# Patient Record
Sex: MALE | Race: WHITE | NOT HISPANIC OR LATINO | URBAN - NONMETROPOLITAN AREA
[De-identification: names, ages, dates, MRNs, and addresses within clinical notes are randomized per-mention and may not be internally consistent; named-entity substitution may affect disease eponyms.]

---

## 2023-10-19 ENCOUNTER — TELEPHONE (OUTPATIENT)
Dept: CARDIOLOGY | Facility: CLINIC | Age: 58
End: 2023-10-19
Payer: MEDICARE

## 2023-10-20 NOTE — TELEPHONE ENCOUNTER
Wife states patient was started on Pantoprazole 40 mg BID be sent to [facility]. She is asking Patience Guerrero for refills. Patient will need to contact PCP for non cardiac medication.

## 2023-10-22 PROBLEM — E11.9 DIABETES MELLITUS (MULTI): Status: ACTIVE | Noted: 2023-10-22

## 2023-10-22 PROBLEM — G47.33 OSA TREATED WITH BIPAP: Status: ACTIVE | Noted: 2023-10-22

## 2023-10-22 PROBLEM — I25.10 CAD (CORONARY ARTERY DISEASE): Status: ACTIVE | Noted: 2023-10-22

## 2023-10-22 PROBLEM — E66.01 MORBID OBESITY WITH BMI OF 50.0-59.9, ADULT (MULTI): Status: ACTIVE | Noted: 2023-10-22

## 2023-10-22 PROBLEM — R06.89 DIFFICULTY BREATHING: Status: ACTIVE | Noted: 2023-10-22

## 2023-10-22 PROBLEM — I50.30 DIASTOLIC CONGESTIVE HEART FAILURE (MULTI): Status: ACTIVE | Noted: 2023-10-22

## 2023-10-22 PROBLEM — I25.2 HISTORY OF NON-ST ELEVATION MYOCARDIAL INFARCTION (NSTEMI): Status: ACTIVE | Noted: 2023-10-22

## 2023-10-22 PROBLEM — I10 HTN (HYPERTENSION): Status: ACTIVE | Noted: 2023-10-22

## 2023-10-22 PROBLEM — E78.5 HYPERLIPIDEMIA: Status: ACTIVE | Noted: 2023-10-22

## 2023-10-22 PROBLEM — F17.201 NICOTINE DEPENDENCE IN REMISSION: Status: ACTIVE | Noted: 2023-10-22

## 2023-10-22 RX ORDER — CLOPIDOGREL BISULFATE 75 MG/1
TABLET ORAL
COMMUNITY
Start: 2022-11-30 | End: 2023-10-24 | Stop reason: ALTCHOICE

## 2023-10-22 RX ORDER — METFORMIN HYDROCHLORIDE 500 MG/1
1 TABLET ORAL EVERY EVENING
COMMUNITY

## 2023-10-22 RX ORDER — PANTOPRAZOLE SODIUM 40 MG/1
40 TABLET, DELAYED RELEASE ORAL 2 TIMES DAILY
COMMUNITY
Start: 2023-09-05

## 2023-10-22 RX ORDER — CARVEDILOL 25 MG/1
TABLET ORAL
COMMUNITY
Start: 2022-01-12 | End: 2024-03-28 | Stop reason: SDUPTHER

## 2023-10-22 RX ORDER — INDAPAMIDE 2.5 MG/1
TABLET ORAL
COMMUNITY
Start: 2023-08-07 | End: 2023-10-24 | Stop reason: SINTOL

## 2023-10-22 RX ORDER — OMEPRAZOLE 40 MG/1
40 CAPSULE, DELAYED RELEASE ORAL DAILY PRN
COMMUNITY
End: 2023-10-24 | Stop reason: ALTCHOICE

## 2023-10-22 RX ORDER — NAPROXEN SODIUM 220 MG/1
1 TABLET, FILM COATED ORAL DAILY
COMMUNITY

## 2023-10-22 RX ORDER — ATORVASTATIN CALCIUM 80 MG/1
1 TABLET, FILM COATED ORAL NIGHTLY
COMMUNITY
Start: 2022-11-30

## 2023-10-22 RX ORDER — AMLODIPINE BESYLATE 10 MG/1
2 TABLET ORAL DAILY
COMMUNITY
Start: 2022-06-16 | End: 2024-05-08 | Stop reason: SDUPTHER

## 2023-10-22 RX ORDER — IRBESARTAN 300 MG/1
300 TABLET ORAL DAILY
COMMUNITY
Start: 2023-09-14 | End: 2023-10-24

## 2023-10-22 RX ORDER — SPIRONOLACTONE 25 MG/1
25 TABLET ORAL DAILY
COMMUNITY
Start: 2023-08-29

## 2023-10-22 RX ORDER — HYDRALAZINE HYDROCHLORIDE 50 MG/1
TABLET, FILM COATED ORAL
COMMUNITY
Start: 2023-08-07 | End: 2023-10-24 | Stop reason: SINTOL

## 2023-10-22 RX ORDER — FUROSEMIDE 20 MG/1
TABLET ORAL
COMMUNITY
Start: 2023-02-02 | End: 2023-10-24 | Stop reason: ALTCHOICE

## 2023-10-22 RX ORDER — NITROGLYCERIN 0.4 MG/1
TABLET SUBLINGUAL
COMMUNITY

## 2023-10-22 RX ORDER — HYDROXYZINE HYDROCHLORIDE 50 MG/1
50 TABLET, FILM COATED ORAL NIGHTLY PRN
COMMUNITY
Start: 2023-09-21

## 2023-10-22 RX ORDER — GLIPIZIDE 5 MG/1
1 TABLET ORAL DAILY
COMMUNITY

## 2023-10-22 RX ORDER — CLONIDINE HYDROCHLORIDE 0.3 MG/1
TABLET ORAL
COMMUNITY
End: 2023-10-24 | Stop reason: DRUGHIGH

## 2023-10-22 RX ORDER — METFORMIN HYDROCHLORIDE 1000 MG/1
1 TABLET ORAL
COMMUNITY

## 2023-10-23 NOTE — TELEPHONE ENCOUNTER
Phoned and spoke with wife. Advised that non-cardiac will need to contact PCP. She will contact them

## 2023-10-24 ENCOUNTER — OFFICE VISIT (OUTPATIENT)
Dept: CARDIOLOGY | Facility: CLINIC | Age: 58
End: 2023-10-24
Payer: MEDICARE

## 2023-10-24 VITALS — SYSTOLIC BLOOD PRESSURE: 130 MMHG | BODY MASS INDEX: 45.1 KG/M2 | WEIGHT: 315 LBS | HEIGHT: 70 IN | HEART RATE: 76 BPM

## 2023-10-24 DIAGNOSIS — I10 PRIMARY HYPERTENSION: ICD-10-CM

## 2023-10-24 DIAGNOSIS — I10 WHITE COAT SYNDROME WITH DIAGNOSIS OF HYPERTENSION: Primary | ICD-10-CM

## 2023-10-24 DIAGNOSIS — E66.01 MORBID OBESITY WITH BMI OF 50.0-59.9, ADULT (MULTI): ICD-10-CM

## 2023-10-24 PROCEDURE — 99212 OFFICE O/P EST SF 10 MIN: CPT | Performed by: NURSE PRACTITIONER

## 2023-10-24 PROCEDURE — 1036F TOBACCO NON-USER: CPT | Performed by: NURSE PRACTITIONER

## 2023-10-24 PROCEDURE — 4010F ACE/ARB THERAPY RXD/TAKEN: CPT | Performed by: NURSE PRACTITIONER

## 2023-10-24 PROCEDURE — 3008F BODY MASS INDEX DOCD: CPT | Performed by: NURSE PRACTITIONER

## 2023-10-24 RX ORDER — IRBESARTAN 75 MG/1
75 TABLET ORAL NIGHTLY
Qty: 30 TABLET | Refills: 11 | Status: SHIPPED | OUTPATIENT
Start: 2023-10-24 | End: 2024-10-23

## 2023-10-24 RX ORDER — CLONIDINE HYDROCHLORIDE 0.2 MG/1
1 TABLET ORAL 2 TIMES DAILY
COMMUNITY
End: 2024-02-07

## 2023-10-24 NOTE — PROGRESS NOTES
Subjective:   Lb Joyce is a 58 y.o. male with hypertension.  Patient presents to the office ambulatory with steady gait, is accompanied by his wife.  Last evaluated in clinic by myself September 2023.  At that time he had been out of clonidine and irbesartan 300 mg for approximately 5 days.  He resumed the dose of clonidine but did not resume irbesartan.    His home wrist blood pressure machine has been calibrated and remains accurate.  His home blood pressure recordings remain with systolic blood pressure consistently in the 130s.  He is once again elevated in the office today consistent with whitecoat syndrome.    Due to history of diabetes would prefer ACE/ARB for renal protection, discussed in detail.  Although somewhat reluctant, he is agreeable to resume atorvastatin 75 mg daily.  He will continue to track his blood pressure at home with a systolic goal less than 140.    Otherwise, patient denies any change in overall cardiovascular status since last evaluation in clinic.    Current Outpatient Medications   Medication Sig Dispense Refill    amLODIPine (Norvasc) 10 mg tablet Take 2 tablets (20 mg) by mouth once daily.      aspirin 81 mg chewable tablet Chew 1 tablet (81 mg) once daily.      atorvastatin (Lipitor) 80 mg tablet Take 1 tablet (80 mg) by mouth once daily at bedtime.      carvedilol (Coreg) 25 mg tablet TAKE 1 AND 1/2 TABLETS TWICE DAILY      cloNIDine (Catapres) 0.2 mg tablet Take 1 tablet (0.2 mg) by mouth 2 times a day.      glipiZIDE (Glucotrol) 5 mg tablet Take 1 tablet (5 mg) by mouth once daily.      hydrOXYzine HCL (Atarax) 50 mg tablet Take 1 tablet (50 mg) by mouth as needed at bedtime.      metFORMIN (Glucophage) 1,000 mg tablet Take 1 tablet (1,000 mg) by mouth once daily in the morning. Take before meals.      metFORMIN (Glucophage) 500 mg tablet Take 1 tablet (500 mg) by mouth once daily in the evening.      nitroglycerin (Nitrostat) 0.4 mg SL tablet PLACE 1 TABLET UNDER THE  "TONGUE EVERY 5 MINUTES UP TO 3 DOSES AS NEEDED FOR CHEST PAIN.      pantoprazole (ProtoNix) 40 mg EC tablet Take 1 tablet (40 mg) by mouth 2 times a day.      spironolactone (Aldactone) 25 mg tablet Take 1 tablet (25 mg) by mouth once daily.      irbesartan (Avapro) 75 mg tablet Take 1 tablet (75 mg) by mouth once daily at bedtime. 30 tablet 11     No current facility-administered medications for this visit.      Hypertension ROS: taking medications as instructed, no medication side effects noted, home BP monitoring in range of 130's systolic over 70's diastolic, no TIA's, no chest pain on exertion, no dyspnea on exertion, and no swelling of ankles.   New concerns: denies.     Objective:   BP (!) 130/0   Pulse 76   Ht 1.778 m (5' 10\")   Wt (!) 195 kg (430 lb)   BMI 61.70 kg/m²    Appearance alert, well appearing, and in no distress.  General exam BP noted to be borderline elevated today in office though normal at other visits, S1, S2 normal, no gallop, no murmur, chest clear, no JVD, no HSM, no edema.   Lab review: no lab studies available for review at time of visit.     Assessment:    Hypertension stable, no significant medication side effects noted, and needs to follow diet more regularly.     White coat syndrome with diagnosis of hypertension  His home machine has been calibrated and remains optimal  Recordings of home blood pressure consistently in the 130s    HTN (hypertension)  Optimal on home recordings  He does have PCP follow-up within the next 8 weeks for reassessment in clinic  Agrees to begin irbesartan 75 mg due to history of diabetes    Morbid obesity with BMI of 50.0-59.9, adult (CMS/[facility])  Reviewed the merits of healthy lifestyle choices on overall cardiovascular health.         Plan:   Reviewed diet, exercise and weight control.  Recommended sodium restriction.    Will begin irbesartan 75mg daily  Keep scheduled follow up with Dr. Hussain Guerrero  MSN, APRN-CNP, PMHNP-[facility] " [facility] - [place]    Please excuse any errors in grammar or translation related to this dictation. Voice recognition software was utilized to prepare this document.

## 2023-10-24 NOTE — ASSESSMENT & PLAN NOTE
His home machine has been calibrated and remains optimal  Recordings of home blood pressure consistently in the 130s

## 2023-10-24 NOTE — ASSESSMENT & PLAN NOTE
Optimal on home recordings  He does have PCP follow-up within the next 8 weeks for reassessment in clinic  Agrees to begin irbesartan 75 mg due to history of diabetes

## 2023-10-24 NOTE — PATIENT INSTRUCTIONS
Please bring all medicines, vitamins, and herbal supplements with you when you come to the office.    Prescriptions will not be filled unless you are compliant with your follow up appointments or have a follow up appointment scheduled as per instruction of your physician. Refills should be requested at the time of your visit.    PLAN:   Through informed decision making process incorporating patients unique circumstances, the following treatment plan will be initiated:    1.  Prescription drug management of cardiovascular medication for efficacy, adherence to treatment, side effect assessment and polypharmacy. Current treatment clinically warranted and to continue with following modifications:    -  Begin irbesartan 75mg daily    2. Return for follow-up; in the interim, contact the office if new symptoms arise.  Dr. Nixon as scheduled    3.  Please call me if home blood pressure becomes elevated.  Goal is top number below 140

## 2023-10-25 DIAGNOSIS — I10 PRIMARY HYPERTENSION: ICD-10-CM

## 2023-10-25 DIAGNOSIS — I10 WHITE COAT SYNDROME WITH DIAGNOSIS OF HYPERTENSION: ICD-10-CM

## 2023-10-25 RX ORDER — IRBESARTAN 75 MG/1
75 TABLET ORAL NIGHTLY
Qty: 30 TABLET | Refills: 11 | OUTPATIENT
Start: 2023-10-25 | End: 2024-10-24

## 2024-02-07 DIAGNOSIS — I10 HYPERTENSION, UNSPECIFIED TYPE: ICD-10-CM

## 2024-02-07 RX ORDER — CLONIDINE HYDROCHLORIDE 0.2 MG/1
0.2 TABLET ORAL 2 TIMES DAILY
Qty: 180 TABLET | Refills: 3 | Status: SHIPPED | OUTPATIENT
Start: 2024-02-07

## 2024-03-28 DIAGNOSIS — I25.2 HISTORY OF NON-ST ELEVATION MYOCARDIAL INFARCTION (NSTEMI): ICD-10-CM

## 2024-03-28 DIAGNOSIS — I10 HYPERTENSION, UNSPECIFIED TYPE: ICD-10-CM

## 2024-04-01 RX ORDER — CARVEDILOL 25 MG/1
37.5 TABLET ORAL
Qty: 270 TABLET | Refills: 3 | Status: SHIPPED | OUTPATIENT
Start: 2024-04-01 | End: 2025-04-01

## 2024-05-08 DIAGNOSIS — I10 HYPERTENSION, UNSPECIFIED TYPE: ICD-10-CM

## 2024-05-09 RX ORDER — AMLODIPINE BESYLATE 10 MG/1
20 TABLET ORAL DAILY
Qty: 2 TABLET | Refills: 3 | Status: SHIPPED | OUTPATIENT
Start: 2024-05-09 | End: 2024-05-15 | Stop reason: SDUPTHER

## 2024-05-15 DIAGNOSIS — I10 HYPERTENSION, UNSPECIFIED TYPE: ICD-10-CM

## 2024-05-15 RX ORDER — AMLODIPINE BESYLATE 10 MG/1
20 TABLET ORAL DAILY
Qty: 180 TABLET | Refills: 3 | Status: SHIPPED | OUTPATIENT
Start: 2024-05-15 | End: 2025-05-15

## 2024-05-15 NOTE — TELEPHONE ENCOUNTER
Last prescription was only sent in for 8 tablets total. Updated request to 90 day supply x3 refills. Pending OV with Dr. Petar Nixon DO on 6/19/2024.

## 2024-06-19 ENCOUNTER — APPOINTMENT (OUTPATIENT)
Dept: CARDIOLOGY | Facility: CLINIC | Age: 59
End: 2024-06-19
Payer: MEDICARE

## 2024-06-19 VITALS
DIASTOLIC BLOOD PRESSURE: 96 MMHG | HEART RATE: 80 BPM | BODY MASS INDEX: 44.1 KG/M2 | SYSTOLIC BLOOD PRESSURE: 215 MMHG | HEIGHT: 71 IN | WEIGHT: 315 LBS

## 2024-06-19 DIAGNOSIS — Z98.61 HISTORY OF PTCA: ICD-10-CM

## 2024-06-19 DIAGNOSIS — E66.01 MORBID OBESITY WITH BMI OF 50.0-59.9, ADULT (MULTI): ICD-10-CM

## 2024-06-19 DIAGNOSIS — I25.2 HISTORY OF NON-ST ELEVATION MYOCARDIAL INFARCTION (NSTEMI): ICD-10-CM

## 2024-06-19 DIAGNOSIS — I25.10 CORONARY ARTERY DISEASE INVOLVING NATIVE CORONARY ARTERY OF NATIVE HEART WITHOUT ANGINA PECTORIS: ICD-10-CM

## 2024-06-19 DIAGNOSIS — I10 WHITE COAT SYNDROME WITH DIAGNOSIS OF HYPERTENSION: ICD-10-CM

## 2024-06-19 DIAGNOSIS — R03.0 BLOOD PRESSURE ELEVATED WITHOUT HISTORY OF HTN: ICD-10-CM

## 2024-06-19 DIAGNOSIS — I50.30 DIASTOLIC CONGESTIVE HEART FAILURE, UNSPECIFIED HF CHRONICITY (MULTI): ICD-10-CM

## 2024-06-19 DIAGNOSIS — I10 PRIMARY HYPERTENSION: ICD-10-CM

## 2024-06-19 DIAGNOSIS — Z87.891 FORMER SMOKER: ICD-10-CM

## 2024-06-19 DIAGNOSIS — E11.9 TYPE 2 DIABETES MELLITUS WITHOUT COMPLICATION, WITHOUT LONG-TERM CURRENT USE OF INSULIN (MULTI): ICD-10-CM

## 2024-06-19 DIAGNOSIS — E78.2 MIXED HYPERLIPIDEMIA: ICD-10-CM

## 2024-06-19 DIAGNOSIS — G47.33 OSA TREATED WITH BIPAP: ICD-10-CM

## 2024-06-19 DIAGNOSIS — I10 HYPERTENSION, UNSPECIFIED TYPE: ICD-10-CM

## 2024-06-19 PROCEDURE — 1036F TOBACCO NON-USER: CPT | Performed by: INTERNAL MEDICINE

## 2024-06-19 PROCEDURE — 3077F SYST BP >= 140 MM HG: CPT | Performed by: INTERNAL MEDICINE

## 2024-06-19 PROCEDURE — 3080F DIAST BP >= 90 MM HG: CPT | Performed by: INTERNAL MEDICINE

## 2024-06-19 PROCEDURE — 4010F ACE/ARB THERAPY RXD/TAKEN: CPT | Performed by: INTERNAL MEDICINE

## 2024-06-19 PROCEDURE — 99214 OFFICE O/P EST MOD 30 MIN: CPT | Performed by: INTERNAL MEDICINE

## 2024-06-19 PROCEDURE — 3008F BODY MASS INDEX DOCD: CPT | Performed by: INTERNAL MEDICINE

## 2024-06-19 RX ORDER — CARVEDILOL 25 MG/1
37.5 TABLET ORAL
Qty: 270 TABLET | Refills: 3 | Status: SHIPPED | OUTPATIENT
Start: 2024-06-19 | End: 2024-06-19 | Stop reason: SDUPTHER

## 2024-06-19 RX ORDER — CLONIDINE HYDROCHLORIDE 0.2 MG/1
0.2 TABLET ORAL 2 TIMES DAILY
Qty: 180 TABLET | Refills: 3 | Status: SHIPPED | OUTPATIENT
Start: 2024-06-19

## 2024-06-19 RX ORDER — SPIRONOLACTONE 25 MG/1
25 TABLET ORAL DAILY
Qty: 90 TABLET | Refills: 3 | Status: SHIPPED | OUTPATIENT
Start: 2024-06-19 | End: 2024-06-19 | Stop reason: SDUPTHER

## 2024-06-19 RX ORDER — AMLODIPINE BESYLATE 10 MG/1
20 TABLET ORAL DAILY
Qty: 180 TABLET | Refills: 3 | Status: SHIPPED | OUTPATIENT
Start: 2024-06-19 | End: 2025-06-19

## 2024-06-19 RX ORDER — CARVEDILOL 25 MG/1
37.5 TABLET ORAL
Qty: 270 TABLET | Refills: 3 | Status: SHIPPED | OUTPATIENT
Start: 2024-06-19 | End: 2025-06-19

## 2024-06-19 RX ORDER — IRBESARTAN 150 MG/1
150 TABLET ORAL DAILY
Qty: 90 TABLET | Refills: 3 | Status: SHIPPED | OUTPATIENT
Start: 2024-06-19 | End: 2025-06-19

## 2024-06-19 RX ORDER — AMLODIPINE BESYLATE 10 MG/1
20 TABLET ORAL DAILY
Qty: 180 TABLET | Refills: 3 | Status: SHIPPED | OUTPATIENT
Start: 2024-06-19 | End: 2024-06-19 | Stop reason: SDUPTHER

## 2024-06-19 RX ORDER — ATORVASTATIN CALCIUM 80 MG/1
80 TABLET, FILM COATED ORAL NIGHTLY
Qty: 90 TABLET | Refills: 3 | Status: SHIPPED | OUTPATIENT
Start: 2024-06-19

## 2024-06-19 RX ORDER — CLONIDINE HYDROCHLORIDE 0.2 MG/1
0.2 TABLET ORAL 2 TIMES DAILY
Qty: 180 TABLET | Refills: 3 | Status: SHIPPED | OUTPATIENT
Start: 2024-06-19 | End: 2024-06-19 | Stop reason: SDUPTHER

## 2024-06-19 RX ORDER — IRBESARTAN 150 MG/1
150 TABLET ORAL DAILY
Qty: 90 TABLET | Refills: 3 | Status: SHIPPED | OUTPATIENT
Start: 2024-06-19 | End: 2024-06-19 | Stop reason: SDUPTHER

## 2024-06-19 RX ORDER — ATORVASTATIN CALCIUM 80 MG/1
80 TABLET, FILM COATED ORAL NIGHTLY
Qty: 90 TABLET | Refills: 3 | Status: SHIPPED | OUTPATIENT
Start: 2024-06-19 | End: 2024-06-19 | Stop reason: SDUPTHER

## 2024-06-19 RX ORDER — SPIRONOLACTONE 25 MG/1
25 TABLET ORAL DAILY
Qty: 90 TABLET | Refills: 3 | Status: SHIPPED | OUTPATIENT
Start: 2024-06-19

## 2024-06-19 NOTE — PATIENT INSTRUCTIONS
Please bring all medicines, vitamins, and herbal supplements with you when you come to the office.    Prescriptions will not be filled unless you are compliant with your follow up appointments or have a follow up appointment scheduled as per instruction of your physician. Refills should be requested at the time of your visit.     BMI was above normal measurement. Current weight: (!) 194 kg (427 lb)  Weight change since last visit (-) denotes wt loss -3 lbs   Weight loss needed to achieve BMI 25: 248.1 Lbs  Weight loss needed to achieve BMI 30: 212.4 Lbs  Provided instructions on dietary changes.

## 2024-06-19 NOTE — LETTER
June 19, 2024     Beverly Fernandez, APRN-CNP  [address] [facility] [address]    Patient: Efrain Joyce   YOB: 1965   Date of Visit: 6/19/2024       Dear Dr. Beverly Fernandez, APRN-CNP:    Thank you for referring Efrain Joyce to me for evaluation. Below are my notes for this consultation.  If you have questions, please do not hesitate to call me. I look forward to following your patient along with you.       Sincerely,     Petar Nixon, DO      CC: No Recipients  ______________________________________________________________________________________    Subjective   Lb Joyce is a 59 y.o. male       Chief Complaint    Follow-up          59-year-old gentleman returns for follow-up he is doing well from a cardiovascular standpoint with no recurrent cardiovascular symptoms, complaints or nitrate usage or hospitalizations.    He has a history of ASHD, morbid obesity, accelerated persistent hypertension, diabetes mellitus. He was a former smoker currently smokes marijuana.     He has a history of high risk non-ST elevation MI January 2022 with revascularization of the proximal circumflex with a large drug-eluting stent he has had no cardiovascular events for the past 18 months.    Today's blood pressure is elevated at 215/96 on my measurement.  He states his blood pressure usually runs 147/80 at home.  His Fitbit heart rate telemetry is reviewed as well.  He has no complaints of heart failure, palpitations or syncope or angina.  He is otherwise ambulatory, reasonably active, morbidly obese    Recommendations: Will escalate his irbesartan 150 mg daily, obtain ambulatory blood pressure check via monitoring at home;, high-sensitivity CRP and lipid panel and follow-up with blood pressure check in the next 6 months with nurse practitioner I will see him again in 1 year           Review of Systems   Cardiovascular:  Positive for chest pain.   All other  "systems reviewed and are negative.           Vitals:    06/19/24 0920 06/19/24 0926 06/19/24 0941   BP: (!) 212/142 (!) 190/90 (!) 215/96   BP Location: Right arm Left arm Left arm   Patient Position: Sitting Sitting Sitting   Pulse: 80     Weight: (!) 194 kg (427 lb)     Height: 1.803 m (5' 11\")          Objective   Physical Exam  Constitutional:       Appearance: Normal appearance.   HENT:      Nose: Nose normal.   Neck:      Vascular: No carotid bruit.   Cardiovascular:      Rate and Rhythm: Normal rate.      Pulses: Normal pulses.      Heart sounds: Normal heart sounds.   Pulmonary:      Effort: Pulmonary effort is normal.   Abdominal:      General: Bowel sounds are normal.      Palpations: Abdomen is soft.   Musculoskeletal:         General: Normal range of motion.      Cervical back: Normal range of motion.      Right lower leg: No edema.      Left lower leg: No edema.   Skin:     General: Skin is warm and dry.   Neurological:      General: No focal deficit present.      Mental Status: He is alert.   Psychiatric:         Mood and Affect: Mood normal.         Behavior: Behavior normal.         Thought Content: Thought content normal.         Judgment: Judgment normal.         Allergies  Hydralazine, Indapamide, and Benzonatate     Current Medications    Current Outpatient Medications:   •  amLODIPine (Norvasc) 10 mg tablet, Take 2 tablets (20 mg) by mouth once daily., Disp: 180 tablet, Rfl: 3  •  aspirin 81 mg chewable tablet, Chew 1 tablet (81 mg) once daily., Disp: , Rfl:   •  atorvastatin (Lipitor) 80 mg tablet, Take 1 tablet (80 mg) by mouth once daily at bedtime., Disp: , Rfl:   •  carvedilol (Coreg) 25 mg tablet, Take 1.5 tablets (37.5 mg) by mouth 2 times a day with meals.  TAKE 1 AND 1/2 TABLETS TWICE DAILY, Disp: 270 tablet, Rfl: 3  •  cloNIDine (Catapres) 0.2 mg tablet, TAKE 1 TABLET TWICE DAILY, Disp: 180 tablet, Rfl: 3  •  glipiZIDE (Glucotrol) 5 mg tablet, Take 1 tablet (5 mg) by mouth once daily., " Disp: , Rfl:   •  hydrOXYzine HCL (Atarax) 50 mg tablet, Take 1 tablet (50 mg) by mouth as needed at bedtime., Disp: , Rfl:   •  irbesartan (Avapro) 75 mg tablet, Take 1 tablet (75 mg) by mouth once daily at bedtime., Disp: 30 tablet, Rfl: 11  •  metFORMIN (Glucophage) 1,000 mg tablet, Take 1 tablet (1,000 mg) by mouth once daily in the morning. Take before meals., Disp: , Rfl:   •  metFORMIN (Glucophage) 500 mg tablet, Take 1 tablet (500 mg) by mouth once daily in the evening., Disp: , Rfl:   •  nitroglycerin (Nitrostat) 0.4 mg SL tablet, PLACE 1 TABLET UNDER THE TONGUE EVERY 5 MINUTES UP TO 3 DOSES AS NEEDED FOR CHEST PAIN., Disp: , Rfl:   •  pantoprazole (ProtoNix) 40 mg EC tablet, Take 1 tablet (40 mg) by mouth 2 times a day., Disp: , Rfl:   •  spironolactone (Aldactone) 25 mg tablet, Take 1 tablet (25 mg) by mouth once daily., Disp: , Rfl:                      Assessment/Plan   1. Primary hypertension        2. Coronary artery disease involving native coronary artery of native heart without angina pectoris        3. History of non-ST elevation myocardial infarction (NSTEMI)        4. History of PTCA        5. Mixed hyperlipidemia        6. Diastolic congestive heart failure, unspecified HF chronicity (Multi)        7. Type 2 diabetes mellitus without complication, without long-term current use of insulin (Multi)        8. Morbid obesity with BMI of 50.0-59.9, adult (Multi)        9. MATY treated with BiPAP        10. Former smoker        11. White coat syndrome with diagnosis of hypertension        12. Hypertension, unspecified type                 Scribe Attestation  By signing my name below, I, La Nena Velasquez LPN   attest that this documentation has been prepared under the direction and in the presence of Petar Nixon DO.     Provider Attestation - Scribe documentation    All medical record entries made by the Scribe were at my direction and personally dictated by me. I have reviewed the chart and  agree that the record accurately reflects my personal performance of the history, physical exam, discussion and plan.

## 2024-06-19 NOTE — PROGRESS NOTES
"Subjective   Lb Joyce is a 59 y.o. male       Chief Complaint    Follow-up          59-year-old gentleman returns for follow-up he is doing well from a cardiovascular standpoint with no recurrent cardiovascular symptoms, complaints or nitrate usage or hospitalizations.    He has a history of ASHD, morbid obesity, accelerated persistent hypertension, diabetes mellitus. He was a former smoker currently smokes marijuana.     He has a history of high risk non-ST elevation MI January 2022 with revascularization of the proximal circumflex with a large drug-eluting stent he has had no cardiovascular events for the past 18 months.    Today's blood pressure is elevated at 215/96 on my measurement.  He states his blood pressure usually runs 147/80 at home.  His Fitbit heart rate telemetry is reviewed as well.  He has no complaints of heart failure, palpitations or syncope or angina.  He is otherwise ambulatory, reasonably active, morbidly obese    Recommendations: Will escalate his irbesartan 150 mg daily, obtain ambulatory blood pressure check via monitoring at home;, high-sensitivity CRP and lipid panel and follow-up with blood pressure check in the next 6 months with nurse practitioner I will see him again in 1 year           Review of Systems   Cardiovascular:  Positive for chest pain.   All other systems reviewed and are negative.           Vitals:    06/19/24 0920 06/19/24 0926 06/19/24 0941   BP: (!) 212/142 (!) 190/90 (!) 215/96   BP Location: Right arm Left arm Left arm   Patient Position: Sitting Sitting Sitting   Pulse: 80     Weight: (!) 194 kg (427 lb)     Height: 1.803 m (5' 11\")          Objective   Physical Exam  Constitutional:       Appearance: Normal appearance.   HENT:      Nose: Nose normal.   Neck:      Vascular: No carotid bruit.   Cardiovascular:      Rate and Rhythm: Normal rate.      Pulses: Normal pulses.      Heart sounds: Normal heart sounds.   Pulmonary:      Effort: Pulmonary effort is " normal.   Abdominal:      General: Bowel sounds are normal.      Palpations: Abdomen is soft.   Musculoskeletal:         General: Normal range of motion.      Cervical back: Normal range of motion.      Right lower leg: No edema.      Left lower leg: No edema.   Skin:     General: Skin is warm and dry.   Neurological:      General: No focal deficit present.      Mental Status: He is alert.   Psychiatric:         Mood and Affect: Mood normal.         Behavior: Behavior normal.         Thought Content: Thought content normal.         Judgment: Judgment normal.         Allergies  Hydralazine, Indapamide, and Benzonatate     Current Medications    Current Outpatient Medications:     amLODIPine (Norvasc) 10 mg tablet, Take 2 tablets (20 mg) by mouth once daily., Disp: 180 tablet, Rfl: 3    aspirin 81 mg chewable tablet, Chew 1 tablet (81 mg) once daily., Disp: , Rfl:     atorvastatin (Lipitor) 80 mg tablet, Take 1 tablet (80 mg) by mouth once daily at bedtime., Disp: , Rfl:     carvedilol (Coreg) 25 mg tablet, Take 1.5 tablets (37.5 mg) by mouth 2 times a day with meals.  TAKE 1 AND 1/2 TABLETS TWICE DAILY, Disp: 270 tablet, Rfl: 3    cloNIDine (Catapres) 0.2 mg tablet, TAKE 1 TABLET TWICE DAILY, Disp: 180 tablet, Rfl: 3    glipiZIDE (Glucotrol) 5 mg tablet, Take 1 tablet (5 mg) by mouth once daily., Disp: , Rfl:     hydrOXYzine HCL (Atarax) 50 mg tablet, Take 1 tablet (50 mg) by mouth as needed at bedtime., Disp: , Rfl:     irbesartan (Avapro) 75 mg tablet, Take 1 tablet (75 mg) by mouth once daily at bedtime., Disp: 30 tablet, Rfl: 11    metFORMIN (Glucophage) 1,000 mg tablet, Take 1 tablet (1,000 mg) by mouth once daily in the morning. Take before meals., Disp: , Rfl:     metFORMIN (Glucophage) 500 mg tablet, Take 1 tablet (500 mg) by mouth once daily in the evening., Disp: , Rfl:     nitroglycerin (Nitrostat) 0.4 mg SL tablet, PLACE 1 TABLET UNDER THE TONGUE EVERY 5 MINUTES UP TO 3 DOSES AS NEEDED FOR CHEST PAIN.,  Disp: , Rfl:     pantoprazole (ProtoNix) 40 mg EC tablet, Take 1 tablet (40 mg) by mouth 2 times a day., Disp: , Rfl:     spironolactone (Aldactone) 25 mg tablet, Take 1 tablet (25 mg) by mouth once daily., Disp: , Rfl:                      Assessment/Plan   1. Primary hypertension        2. Coronary artery disease involving native coronary artery of native heart without angina pectoris        3. History of non-ST elevation myocardial infarction (NSTEMI)        4. History of PTCA        5. Mixed hyperlipidemia        6. Diastolic congestive heart failure, unspecified HF chronicity (Multi)        7. Type 2 diabetes mellitus without complication, without long-term current use of insulin (Multi)        8. Morbid obesity with BMI of 50.0-59.9, adult (Multi)        9. MATY treated with BiPAP        10. Former smoker        11. White coat syndrome with diagnosis of hypertension        12. Hypertension, unspecified type                 Scribe Attestation  By signing my name below, IAlejandra LPN, Scribe   attest that this documentation has been prepared under the direction and in the presence of Petar Nixon DO.     Provider Attestation - Scribe documentation    All medical record entries made by the Scribe were at my direction and personally dictated by me. I have reviewed the chart and agree that the record accurately reflects my personal performance of the history, physical exam, discussion and plan.

## 2024-06-20 ENCOUNTER — TELEPHONE (OUTPATIENT)
Dept: CARDIOLOGY | Facility: CLINIC | Age: 59
End: 2024-06-20
Payer: MEDICARE

## 2024-06-20 NOTE — TELEPHONE ENCOUNTER
----- Message from Lb Joyce sent at 6/19/2024  7:40 PM EDT -----  Regarding: Blood pressure at home  Contact: [phone]  When I got home this morning at 10:32 my bp was 176/80 with 69 pulse. About 16 minutes later it was 149/79 with 65 pulse. At 1:12pm it was 133/72 with 62 pulse.  My wife checked with [facility] and they do not do the 24 hour blood pressure monitoring anymore. So we will continue to monitor at home and keep you posted.  Thank you.

## 2024-06-25 DIAGNOSIS — I10 WHITE COAT SYNDROME WITH DIAGNOSIS OF HYPERTENSION: ICD-10-CM

## 2024-06-25 DIAGNOSIS — I10 PRIMARY HYPERTENSION: ICD-10-CM

## 2024-06-25 RX ORDER — IRBESARTAN 150 MG/1
150 TABLET ORAL DAILY
Qty: 90 TABLET | Refills: 3 | OUTPATIENT
Start: 2024-06-25 | End: 2025-06-25

## 2024-06-27 ENCOUNTER — TELEPHONE (OUTPATIENT)
Dept: CARDIOLOGY | Facility: CLINIC | Age: 59
End: 2024-06-27
Payer: MEDICARE

## 2024-06-27 NOTE — TELEPHONE ENCOUNTER
Phoned pt re 24 hr bp check order. [facility] states they no longer do the monitor due to insurance not paying.

## 2024-07-03 LAB
NON-UH HIE C-REACTIVE PROTEIN: < 0.5 (ref 0–0.5)
NON-UH HIE CHOL/HDL RATIO: 3
NON-UH HIE CHOLESTEROL: 124 MG/DL (ref 140–200)
NON-UH HIE HDL CHOLESTEROL: 41 MG/DL (ref 23–92)
NON-UH HIE LDL CHOLESTEROL,CALCULATED: 65 MG/DL (ref 0–100)
NON-UH HIE TRIGLYCERIDE W/REFLEX: 91 MG/DL (ref 0–149)
NON-UH HIE VLDL CHOLESTEROL: 18 MG/DL

## 2024-07-08 ENCOUNTER — TELEPHONE (OUTPATIENT)
Dept: CARDIOLOGY | Facility: CLINIC | Age: 59
End: 2024-07-08
Payer: MEDICARE

## 2024-07-08 DIAGNOSIS — I10 HYPERTENSION, UNSPECIFIED TYPE: ICD-10-CM

## 2024-07-08 NOTE — TELEPHONE ENCOUNTER
Spoke with wife of patient, verbalized understanding. Will continue to check bp in early morning and gaby afternoon. Will split Amlodipine dose 10mg in morning 10 mg nightly.

## 2024-07-08 NOTE — TELEPHONE ENCOUNTER
----- Message from Petar Nixon DO sent at 7/8/2024  2:34 PM EDT -----  Regarding: Ambulatory blood pressure  Thank you for sending in your ambulatory blood pressure measurements over June; looks like your morning blood pressures are the main problem I would simply split your amlodipine that you take 2 tablets daily into morning and evening dosing so 10 mg in the morning and 10 mg in the evening; and remeasure blood pressures.  I think you only need to measure may be 2 blood pressure measurements in the morning and 1 in the early evening  ----- Message -----  From: Zee Randhawa MA  Sent: 7/5/2024  10:14 AM EDT  To: Petar Nixon DO

## 2024-07-11 RX ORDER — AMLODIPINE BESYLATE 10 MG/1
20 TABLET ORAL DAILY
Qty: 180 TABLET | Refills: 3 | Status: SHIPPED | OUTPATIENT
Start: 2024-07-11 | End: 2025-07-11

## 2024-07-25 ENCOUNTER — TELEPHONE (OUTPATIENT)
Dept: CARDIOLOGY | Facility: CLINIC | Age: 59
End: 2024-07-25
Payer: MEDICARE

## 2024-07-25 NOTE — TELEPHONE ENCOUNTER
Phoned patient and discussed good reading and to continue same course. Verbalized understanding.

## 2024-07-25 NOTE — TELEPHONE ENCOUNTER
----- Message from Petar Nixon sent at 7/25/2024 12:12 PM EDT -----  Regarding: Blood pressures  For the most part her blood pressures are under good control and mid to latter portion of the day and high in the early morning I will continue current therapies  ----- Message -----  From: Ginny Acuna  Sent: 7/24/2024   2:21 PM EDT  To: Petar Nixon, DO

## 2024-11-10 PROCEDURE — 93306 TTE W/DOPPLER COMPLETE: CPT | Performed by: INTERNAL MEDICINE

## 2024-11-18 ENCOUNTER — TELEPHONE (OUTPATIENT)
Dept: CARDIOLOGY | Facility: CLINIC | Age: 59
End: 2024-11-18
Payer: MEDICARE

## 2024-11-18 NOTE — TELEPHONE ENCOUNTER
Patient wife phoned in left vm via the nurse line reports was at [facility] 11/2024 for new onset afib (consult in chart, but we need to obtain results and soc, and discharge). Wife reports patient since discharge complaining of fatigue, weakness and headaches. Inquiring if dose adjustments can be made.    We need to update the med list once records recvd and send stat to Dr. Petar Nixon, DO

## 2024-11-19 RX ORDER — DOFETILIDE 0.25 MG/1
250 CAPSULE ORAL EVERY 12 HOURS
COMMUNITY

## 2024-11-19 NOTE — TELEPHONE ENCOUNTER
H &P and consult in the chart.  Call placed to [facility]. Discharge summary requested. Per [facility] MR discharge summary is not available but will sent over discharge meds

## 2024-12-09 ENCOUNTER — APPOINTMENT (OUTPATIENT)
Dept: CARDIOLOGY | Facility: CLINIC | Age: 59
End: 2024-12-09
Payer: MEDICARE

## 2024-12-13 ENCOUNTER — APPOINTMENT (OUTPATIENT)
Dept: CARDIOLOGY | Facility: CLINIC | Age: 59
End: 2024-12-13
Payer: MEDICARE

## 2024-12-13 VITALS
WEIGHT: 315 LBS | BODY MASS INDEX: 44.1 KG/M2 | HEIGHT: 71 IN | DIASTOLIC BLOOD PRESSURE: 76 MMHG | HEART RATE: 81 BPM | SYSTOLIC BLOOD PRESSURE: 132 MMHG

## 2024-12-13 DIAGNOSIS — I10 PRIMARY HYPERTENSION: ICD-10-CM

## 2024-12-13 DIAGNOSIS — I25.10 CORONARY ARTERY DISEASE INVOLVING NATIVE CORONARY ARTERY OF NATIVE HEART WITHOUT ANGINA PECTORIS: ICD-10-CM

## 2024-12-13 DIAGNOSIS — G47.33 OSA TREATED WITH BIPAP: ICD-10-CM

## 2024-12-13 DIAGNOSIS — I48.91 ATRIAL FIBRILLATION WITH RAPID VENTRICULAR RESPONSE (MULTI): ICD-10-CM

## 2024-12-13 DIAGNOSIS — Z79.01 LONG TERM CURRENT USE OF ANTICOAGULANT THERAPY: ICD-10-CM

## 2024-12-13 DIAGNOSIS — I48.0 PAROXYSMAL ATRIAL FIBRILLATION (MULTI): ICD-10-CM

## 2024-12-13 DIAGNOSIS — E11.65 TYPE 2 DIABETES MELLITUS WITH HYPERGLYCEMIA, WITHOUT LONG-TERM CURRENT USE OF INSULIN: Primary | ICD-10-CM

## 2024-12-13 DIAGNOSIS — E78.2 MIXED HYPERLIPIDEMIA: ICD-10-CM

## 2024-12-13 DIAGNOSIS — Z79.899 HIGH RISK MEDICATION USE: ICD-10-CM

## 2024-12-13 PROBLEM — E66.01 MORBID OBESITY WITH BMI OF 50.0-59.9, ADULT (MULTI): Status: RESOLVED | Noted: 2023-10-22 | Resolved: 2024-12-13

## 2024-12-13 PROBLEM — I25.2 HISTORY OF NON-ST ELEVATION MYOCARDIAL INFARCTION (NSTEMI): Status: RESOLVED | Noted: 2023-10-22 | Resolved: 2024-12-13

## 2024-12-13 PROBLEM — I25.119 ATHEROSCLEROSIS OF NATIVE CORONARY ARTERY OF NATIVE HEART WITH ANGINA PECTORIS: Status: ACTIVE | Noted: 2023-10-22

## 2024-12-13 PROBLEM — I25.119 ATHEROSCLEROSIS OF NATIVE CORONARY ARTERY OF NATIVE HEART WITH ANGINA PECTORIS: Status: RESOLVED | Noted: 2023-10-22 | Resolved: 2024-12-13

## 2024-12-13 PROBLEM — E11.9 DIABETES MELLITUS (MULTI): Status: RESOLVED | Noted: 2023-10-22 | Resolved: 2024-12-13

## 2024-12-13 RX ORDER — IRBESARTAN 75 MG/1
1 TABLET ORAL DAILY
COMMUNITY
Start: 2024-11-11

## 2024-12-13 RX ORDER — NAPROXEN SODIUM 220 MG/1
TABLET, FILM COATED ORAL
Qty: 30 TABLET | Refills: 11 | Status: SHIPPED | OUTPATIENT
Start: 2024-12-13

## 2024-12-13 RX ORDER — CLONIDINE HYDROCHLORIDE 0.2 MG/1
0.2 TABLET ORAL 2 TIMES DAILY
COMMUNITY
Start: 2023-08-11

## 2024-12-13 ASSESSMENT — ENCOUNTER SYMPTOMS
IRREGULAR HEARTBEAT: 0
DYSPNEA ON EXERTION: 0
ORTHOPNEA: 0
NEAR-SYNCOPE: 0
PALPITATIONS: 0
SYNCOPE: 0
PND: 0

## 2024-12-13 NOTE — ASSESSMENT & PLAN NOTE
Dofetilide  Start date November 2024  EKG in office normal sinus rhythm, QTc 441.  Creatinine 0.77

## 2024-12-13 NOTE — PROGRESS NOTES
"Chief Complaint  \" Doing okay\"    Reason for Visit  6-month follow-up.  Patient presents to the office today for outpatient follow-up for atrial fibrillation, anticoagulation, high risk med use, coronary artery disease.  Last evaluated in clinic by Dr. Nixon 2024    Presents today ambulatory with steady gait.  Accompanied by spouse    [place]: [place] [facility] [place] noted to have A-fib with RVR.  Seen in consult by Dr. Nixon.  Echocardiogram unremarkable.  Spontaneous conversion normal sinus rhythm initiated on Tikosyn and anticoagulation.    History of Present Illness   Patient is a pleasant 59-year-old gentleman who presents to the office today with no voiced cardiovascular complaints.  He overall reports doing\" okay\".  Since started on Eliquis he had to go off his NSAIDs and has noted increased pain and myalgia.  He describes an atypical chest wall discomfort that kind of comes and goes throughout the day and is not consistent with angina.  He continues to watch his heart rate on his Fitbit without any accelerated heart rate.    Lifestyle modifications for PAF reviewed includin.  Limit alcohol intake: Denies  2.  Limit caffeine intake: Excessive greater than 1 L Mountain Dew, Pepsi daily  3.  Discussed importance of optimal weight control and impact on PAF.  Encourage to obtain optimal BMI.  4.  Obstructive Sleep Apnea: Compliant with BiPAP    Patient reports that overall has no complaint(s) of chest pain, chest pressure/discomfort, claudication, dyspnea, exertional chest pressure/discomfort, fatigue, and irregular heart beat    Daily activity:    Right at 4 METS  Denies any change in exercise capacity or functional tolerance since last office visit.    The importance of secondary prevention reviewed:  HTN: Optimal  HLD: Optimally treated  DM: Treated  Smoker: Denies  BMI:  Reviewed the merits of healthy lifestyle choices on overall cardiovascular health.    Reviewed the " "pathophysiology of atrial fibrillation and need for cardioembolic protection.  Question if this is lone episode of A-fib in the setting of URI and over-the-counter medication.  Due to body habitus, labile blood pressure control and excessive caffeine intake patient is at high risk for for atrial fibrillation.  For now, we will continue with Tikosyn and anticoagulation.    Review of Systems   Cardiovascular:  Negative for chest pain, dyspnea on exertion, irregular heartbeat, leg swelling, near-syncope, orthopnea, palpitations, paroxysmal nocturnal dyspnea and syncope.        Visit Vitals  /76 (BP Location: Right arm, Patient Position: Sitting)   Pulse 81   Ht 1.803 m (5' 11\")   Wt (!) 192 kg (423 lb)   BMI 59.00 kg/m²   Smoking Status Former   BSA 3.1 m²     Physical Exam  Vitals and nursing note reviewed.   Constitutional:       Appearance: Normal appearance. He is morbidly obese.   Cardiovascular:      Rate and Rhythm: Normal rate and regular rhythm.      Heart sounds: Normal heart sounds.   Pulmonary:      Effort: Pulmonary effort is normal.      Breath sounds: Normal breath sounds.   Musculoskeletal:      Cervical back: Full passive range of motion without pain.      Right lower leg: No edema.      Left lower leg: No edema.   Skin:     General: Skin is cool.   Neurological:      Mental Status: He is alert and oriented to person, place, and time.   Psychiatric:         Attention and Perception: Attention normal.         Mood and Affect: Mood normal.         Behavior: Behavior is cooperative.        Allergies   Allergen Reactions    Hydralazine Dizziness    Indapamide Dizziness    Benzonatate Palpitations     Current Outpatient Medications   Medication Instructions    amLODIPine (NORVASC) 20 mg, oral, Daily    apixaban (ELIQUIS) 5 mg, 2 times daily    aspirin 81 mg chewable tablet Take one tablet by mouth twice a week    atorvastatin (LIPITOR) 80 mg, oral, Nightly    carvedilol (COREG) 37.5 mg, oral, 2 times " daily (morning and late afternoon),  TAKE 1 AND 1/2 TABLETS TWICE DAILY    cloNIDine (CATAPRES) 0.2 mg, 2 times daily    dofetilide (TIKOSYN) 250 mcg, Every 12 hours    glipiZIDE (Glucotrol) 5 mg tablet 1 tablet, Daily    irbesartan (Avapro) 75 mg tablet 1 tablet, Daily    metFORMIN (Glucophage) 1,000 mg tablet 1 tablet, Daily before breakfast    metFORMIN (Glucophage) 500 mg tablet 1 tablet, Every evening    nitroglycerin (Nitrostat) 0.4 mg SL tablet PLACE 1 TABLET UNDER THE TONGUE EVERY 5 MINUTES UP TO 3 DOSES AS NEEDED FOR CHEST PAIN.    pantoprazole (PROTONIX) 40 mg, 2 times daily    spironolactone (ALDACTONE) 25 mg, oral, Daily      Assessment:    High risk medication use  Dofetilide  Start date November 2024  EKG in office normal sinus rhythm, QTc 441.  Creatinine 0.77    HTN (hypertension)  Optimal in office    Hyperlipidemia  High intensity statin  October 2024 HDL 40, LDL 77      Paroxysmal atrial fibrillation (Multi)  Initial identification November 2024 hospitalization  He reports taking some type of over-the-counter cold medication then within 30 minutes developed tachycardia-presented with A-fib with RVR heart rate in the 150s.  I believe he converted on a Cardizem drip and then was initiated on Tikosyn and anticoagulation.    He continues to follow his heart rates with his Fitbit watch and has not noted any elevated heart rates.    Inpatient echocardiogram normal LVEF 60 to 65%  Left atrium normal  MR mild    Coronary artery disease involving native coronary artery of native heart without angina pectoris  2022 circumflex PCI    August 2023 cardiac cath  Proximal circumflex patent stent  Otherwise normal coronaries    Current daily activity at 4 METS without concerning symptoms  Troponin and EKG during recent hospitalization negative in the setting of A-fib with RVR    Long term current use of anticoagulant therapy  CHADS VASc 3 anticoagulated full dose Eliquis age 59, weight 192 kg.  He has medical  coverage  Denies bleeding diatheses    BMI 50.0-59.9, adult (Multi)  Reviewed the merits of healthy lifestyle choices on overall cardiovascular health.      MATY treated with BiPAP  Compliant with BiPAP treatment    Plan:     Through informed decision making process incorporating patients unique circumstances, the following treatment plan will be initiated:    1.  Prescription drug management of cardiovascular medication for efficacy, adherence to treatment, side effect assessment and polypharmacy. Current treatment clinically warranted and to continue with following modifications:    -  Reduce ASA 81mg to twice a week    2. Return for follow-up; in the interim, contact the office if new symptoms arise.  Dr. Nixon 6 months     Patience Guerrero MSN, APRN-CNP, PMHNP-[facility] [place]  [place], [place]     Please excuse any errors in grammar or translation related to this dictation. Voice recognition software was utilized to prepare this document.

## 2024-12-13 NOTE — LETTER
"2024     Brittney Macedo, APRN-CNP   [place] [address]    Patient: Efrain Joyce   YOB: 1965   Date of Visit: 2024       Dear Dr. Brittney Macedo, APRN-CNP:    Thank you for referring Ed Isiah to me for evaluation. Below are my notes for this consultation.  If you have questions, please do not hesitate to call me. I look forward to following your patient along with you.       Sincerely,     Patience Guerrero, APRN-CNP      CC: No Recipients  ______________________________________________________________________________________    Chief Complaint  \" Doing okay\"    Reason for Visit  6-month follow-up.  Patient presents to the office today for outpatient follow-up for atrial fibrillation, anticoagulation, high risk med use, coronary artery disease.  Last evaluated in clinic by Dr. Nixon 2024    Presents today ambulatory with steady gait.  Accompanied by spouse    2024: Present[place] [facility] [place], noted to have A-fib with RVR.  Seen in consult by Dr. Nixon.  Echocardiogram unremarkable.  Spontaneous conversion normal sinus rhythm initiated on Tikosyn and anticoagulation.    History of Present Illness   Patient is a pleasant 59-year-old gentleman who presents to the office today with no voiced cardiovascular complaints.  He overall reports doing\" okay\".  Since started on Eliquis he had to go off his NSAIDs and has noted increased pain and myalgia.  He describes an atypical chest wall discomfort that kind of comes and goes throughout the day and is not consistent with angina.  He continues to watch his heart rate on his Fitbit without any accelerated heart rate.    Lifestyle modifications for PAF reviewed includin.  Limit alcohol intake: Denies  2.  Limit caffeine intake: Excessive greater than 1 L Mountain Dew, Pepsi daily  3.  Discussed importance of optimal weight control and impact on PAF.  Encourage to obtain optimal BMI.  4.  Obstructive " "Sleep Apnea: Compliant with BiPAP    Patient reports that overall has no complaint(s) of chest pain, chest pressure/discomfort, claudication, dyspnea, exertional chest pressure/discomfort, fatigue, and irregular heart beat    Daily activity:    Right at 4 METS  Denies any change in exercise capacity or functional tolerance since last office visit.    The importance of secondary prevention reviewed:  HTN: Optimal  HLD: Optimally treated  DM: Treated  Smoker: Denies  BMI:  Reviewed the merits of healthy lifestyle choices on overall cardiovascular health.    Reviewed the pathophysiology of atrial fibrillation and need for cardioembolic protection.  Question if this is lone episode of A-fib in the setting of URI and over-the-counter medication.  Due to body habitus, labile blood pressure control and excessive caffeine intake patient is at high risk for for atrial fibrillation.  For now, we will continue with Tikosyn and anticoagulation.    Review of Systems   Cardiovascular:  Negative for chest pain, dyspnea on exertion, irregular heartbeat, leg swelling, near-syncope, orthopnea, palpitations, paroxysmal nocturnal dyspnea and syncope.        Visit Vitals  /76 (BP Location: Right arm, Patient Position: Sitting)   Pulse 81   Ht 1.803 m (5' 11\")   Wt (!) 192 kg (423 lb)   BMI 59.00 kg/m²   Smoking Status Former   BSA 3.1 m²     Physical Exam  Vitals and nursing note reviewed.   Constitutional:       Appearance: Normal appearance. He is morbidly obese.   Cardiovascular:      Rate and Rhythm: Normal rate and regular rhythm.      Heart sounds: Normal heart sounds.   Pulmonary:      Effort: Pulmonary effort is normal.      Breath sounds: Normal breath sounds.   Musculoskeletal:      Cervical back: Full passive range of motion without pain.      Right lower leg: No edema.      Left lower leg: No edema.   Skin:     General: Skin is cool.   Neurological:      Mental Status: He is alert and oriented to person, place, and " time.   Psychiatric:         Attention and Perception: Attention normal.         Mood and Affect: Mood normal.         Behavior: Behavior is cooperative.        Allergies   Allergen Reactions   • Hydralazine Dizziness   • Indapamide Dizziness   • Benzonatate Palpitations     Current Outpatient Medications   Medication Instructions   • amLODIPine (NORVASC) 20 mg, oral, Daily   • apixaban (ELIQUIS) 5 mg, 2 times daily   • aspirin 81 mg chewable tablet Take one tablet by mouth twice a week   • atorvastatin (LIPITOR) 80 mg, oral, Nightly   • carvedilol (COREG) 37.5 mg, oral, 2 times daily (morning and late afternoon),  TAKE 1 AND 1/2 TABLETS TWICE DAILY   • cloNIDine (CATAPRES) 0.2 mg, 2 times daily   • dofetilide (TIKOSYN) 250 mcg, Every 12 hours   • glipiZIDE (Glucotrol) 5 mg tablet 1 tablet, Daily   • irbesartan (Avapro) 75 mg tablet 1 tablet, Daily   • metFORMIN (Glucophage) 1,000 mg tablet 1 tablet, Daily before breakfast   • metFORMIN (Glucophage) 500 mg tablet 1 tablet, Every evening   • nitroglycerin (Nitrostat) 0.4 mg SL tablet PLACE 1 TABLET UNDER THE TONGUE EVERY 5 MINUTES UP TO 3 DOSES AS NEEDED FOR CHEST PAIN.   • pantoprazole (PROTONIX) 40 mg, 2 times daily   • spironolactone (ALDACTONE) 25 mg, oral, Daily      Assessment:    High risk medication use  Dofetilide  Start date November 2024  EKG in office normal sinus rhythm, QTc 441.  Creatinine 0.77    HTN (hypertension)  Optimal in office    Hyperlipidemia  High intensity statin  October 2024 HDL 40, LDL 77      Paroxysmal atrial fibrillation (Multi)  Initial identification November 2024 hospitalization  He reports taking some type of over-the-counter cold medication then within 30 minutes developed tachycardia-presented with A-fib with RVR heart rate in the 150s.  I believe he converted on a Cardizem drip and then was initiated on Tikosyn and anticoagulation.    He continues to follow his heart rates with his Fitbit watch and has not noted any elevated  heart rates.    Inpatient echocardiogram normal LVEF 60 to 65%  Left atrium normal  MR mild    Coronary artery disease involving native coronary artery of native heart without angina pectoris  2022 circumflex PCI    August 2023 cardiac cath  Proximal circumflex patent stent  Otherwise normal coronaries    Current daily activity at 4 METS without concerning symptoms  Troponin and EKG during recent hospitalization negative in the setting of A-fib with RVR    Long term current use of anticoagulant therapy  CHADS VASc 3 anticoagulated full dose Eliquis age 59, weight 192 kg.  He has medical coverage  Denies bleeding diatheses    BMI 50.0-59.9, adult (Multi)  Reviewed the merits of healthy lifestyle choices on overall cardiovascular health.      MATY treated with BiPAP  Compliant with BiPAP treatment    Plan:     Through informed decision making process incorporating patients unique circumstances, the following treatment plan will be initiated:    1.  Prescription drug management of cardiovascular medication for efficacy, adherence to treatment, side effect assessment and polypharmacy. Current treatment clinically warranted and to continue with following modifications:    -  Reduce ASA 81mg to twice a week    2. Return for follow-up; in the interim, contact the office if new symptoms arise.  Dr. Nixon 6 months     Patience Guerrero MSN, APRN-CNP, PMHNP-[facility] [place]  [place], [place]     Please excuse any errors in grammar or translation related to this dictation. Voice recognition software was utilized to prepare this document.

## 2024-12-13 NOTE — PATIENT INSTRUCTIONS
Please bring all medicines, vitamins, and herbal supplements with you when you come to the office.    Prescriptions will not be filled unless you are compliant with your follow up appointments or have a follow up appointment scheduled as per instruction of your physician. Refills should be requested at the time of your visit.     EKG done in office today     PLAN:     Through informed decision making process incorporating patients unique circumstances, the following treatment plan will be initiated:    1.  Prescription drug management of cardiovascular medication for efficacy, adherence to treatment, side effect assessment and polypharmacy. Current treatment clinically warranted and to continue with following modifications:    -  Reduce ASA 81mg to twice a week    2. Return for follow-up; in the interim, contact the office if new symptoms arise.  Dr. Nixon 6 months

## 2024-12-13 NOTE — ASSESSMENT & PLAN NOTE
2022 circumflex PCI    August 2023 cardiac cath  Proximal circumflex patent stent  Otherwise normal coronaries    Current daily activity at 4 METS without concerning symptoms  Troponin and EKG during recent hospitalization negative in the setting of A-fib with RVR

## 2024-12-13 NOTE — ASSESSMENT & PLAN NOTE
Initial identification November 2024 hospitalization  He reports taking some type of over-the-counter cold medication then within 30 minutes developed tachycardia-presented with A-fib with RVR heart rate in the 150s.  I believe he converted on a Cardizem drip and then was initiated on Tikosyn and anticoagulation.    He continues to follow his heart rates with his Fitbit watch and has not noted any elevated heart rates.    Inpatient echocardiogram normal LVEF 60 to 65%  Left atrium normal  MR mild

## 2024-12-13 NOTE — ASSESSMENT & PLAN NOTE
CHADS VASc 3 anticoagulated full dose Eliquis age 59, weight 192 kg.  He has medical coverage  Denies bleeding diatheses

## 2025-01-06 DIAGNOSIS — I10 PRIMARY HYPERTENSION: ICD-10-CM

## 2025-01-07 DIAGNOSIS — I10 PRIMARY HYPERTENSION: ICD-10-CM

## 2025-01-07 RX ORDER — IRBESARTAN 75 MG/1
75 TABLET ORAL DAILY
Qty: 90 TABLET | Refills: 3 | Status: SHIPPED | OUTPATIENT
Start: 2025-01-07 | End: 2026-01-07

## 2025-01-07 RX ORDER — CLONIDINE HYDROCHLORIDE 0.2 MG/1
0.2 TABLET ORAL 2 TIMES DAILY
Qty: 180 TABLET | Refills: 3 | Status: SHIPPED | OUTPATIENT
Start: 2025-01-07 | End: 2026-01-07

## 2025-01-13 ENCOUNTER — TELEPHONE (OUTPATIENT)
Dept: CARDIOLOGY | Facility: CLINIC | Age: 60
End: 2025-01-13
Payer: MEDICARE

## 2025-01-13 NOTE — TELEPHONE ENCOUNTER
Patient's wife phoned asking for Irbesartan clarification dose. Last refilled at 75mg but looks like it was to be increased to 150 at 6/19/2024 OV. Please advise.    To Dr. Petar Nixon, DO

## 2025-01-14 NOTE — TELEPHONE ENCOUNTER
[facility] pharmacy phones for refill of irbesartan. Please clarify appropriate dose, 75 mg or 150 mg?

## 2025-01-16 NOTE — TELEPHONE ENCOUNTER
"Patient had been taking 150mg since June, even in Dec. States BP has been running within a \"normal\" range. Around 130/70's.  Would like to stay on same dose. Chiquita advise.    To Dr. Petar Nixon DO for review.    Rx to [facility]  "

## 2025-01-16 NOTE — TELEPHONE ENCOUNTER
Attempted to phone wife to discuss and seek clarification on dose patient is currently taking. OV note in June states patient was increased t 150 mg, however at OV in December with Patience Dominguez NP it was reported patient was taking 75 mg.     Will need to confirm dose with patient/spouse and sent refill to [facility].

## 2025-03-31 DIAGNOSIS — I25.2 HISTORY OF NON-ST ELEVATION MYOCARDIAL INFARCTION (NSTEMI): ICD-10-CM

## 2025-03-31 DIAGNOSIS — I10 HYPERTENSION, UNSPECIFIED TYPE: ICD-10-CM

## 2025-03-31 RX ORDER — CARVEDILOL 25 MG/1
37.5 TABLET ORAL
Qty: 270 TABLET | Refills: 3 | Status: SHIPPED | OUTPATIENT
Start: 2025-03-31 | End: 2026-03-31

## 2025-03-31 RX ORDER — AMLODIPINE BESYLATE 10 MG/1
20 TABLET ORAL DAILY
Qty: 180 TABLET | Refills: 3 | Status: SHIPPED | OUTPATIENT
Start: 2025-03-31 | End: 2026-03-31

## 2025-04-08 DIAGNOSIS — I10 HYPERTENSION, UNSPECIFIED TYPE: ICD-10-CM

## 2025-04-09 RX ORDER — CARVEDILOL 25 MG/1
37.5 TABLET ORAL
Qty: 270 TABLET | Refills: 3 | Status: SHIPPED | OUTPATIENT
Start: 2025-04-09 | End: 2026-04-09

## 2025-06-11 DIAGNOSIS — I50.30 DIASTOLIC CONGESTIVE HEART FAILURE, UNSPECIFIED HF CHRONICITY: ICD-10-CM

## 2025-06-11 DIAGNOSIS — I10 PRIMARY HYPERTENSION: ICD-10-CM

## 2025-06-11 DIAGNOSIS — I25.10 CORONARY ARTERY DISEASE INVOLVING NATIVE CORONARY ARTERY OF NATIVE HEART WITHOUT ANGINA PECTORIS: ICD-10-CM

## 2025-06-12 RX ORDER — SPIRONOLACTONE 25 MG/1
25 TABLET ORAL DAILY
Qty: 90 TABLET | Refills: 3 | Status: SHIPPED | OUTPATIENT
Start: 2025-06-12 | End: 2026-06-12

## 2025-06-26 ENCOUNTER — APPOINTMENT (OUTPATIENT)
Dept: CARDIOLOGY | Facility: CLINIC | Age: 60
End: 2025-06-26
Payer: MEDICARE

## 2025-07-09 ENCOUNTER — APPOINTMENT (OUTPATIENT)
Dept: CARDIOLOGY | Facility: CLINIC | Age: 60
End: 2025-07-09
Payer: MEDICARE

## 2025-07-09 VITALS
DIASTOLIC BLOOD PRESSURE: 68 MMHG | SYSTOLIC BLOOD PRESSURE: 142 MMHG | HEIGHT: 70 IN | BODY MASS INDEX: 45.1 KG/M2 | HEART RATE: 63 BPM | WEIGHT: 315 LBS

## 2025-07-09 DIAGNOSIS — G47.33 OSA TREATED WITH BIPAP: ICD-10-CM

## 2025-07-09 DIAGNOSIS — Z87.891 FORMER SMOKER: ICD-10-CM

## 2025-07-09 DIAGNOSIS — Z79.01 LONG TERM CURRENT USE OF ANTICOAGULANT THERAPY: ICD-10-CM

## 2025-07-09 DIAGNOSIS — I48.0 PAROXYSMAL ATRIAL FIBRILLATION (MULTI): ICD-10-CM

## 2025-07-09 DIAGNOSIS — I10 PRIMARY HYPERTENSION: ICD-10-CM

## 2025-07-09 DIAGNOSIS — E78.2 MIXED HYPERLIPIDEMIA: ICD-10-CM

## 2025-07-09 DIAGNOSIS — I25.10 CORONARY ARTERY DISEASE INVOLVING NATIVE CORONARY ARTERY OF NATIVE HEART WITHOUT ANGINA PECTORIS: ICD-10-CM

## 2025-07-09 DIAGNOSIS — Z98.61 HISTORY OF PTCA: ICD-10-CM

## 2025-07-09 DIAGNOSIS — Z79.899 HIGH RISK MEDICATION USE: ICD-10-CM

## 2025-07-09 DIAGNOSIS — I10 HYPERTENSION, UNSPECIFIED TYPE: ICD-10-CM

## 2025-07-09 DIAGNOSIS — I50.30 DIASTOLIC CONGESTIVE HEART FAILURE, UNSPECIFIED HF CHRONICITY: ICD-10-CM

## 2025-07-09 PROCEDURE — 93000 ELECTROCARDIOGRAM COMPLETE: CPT | Performed by: INTERNAL MEDICINE

## 2025-07-09 PROCEDURE — 3078F DIAST BP <80 MM HG: CPT | Performed by: INTERNAL MEDICINE

## 2025-07-09 PROCEDURE — 1036F TOBACCO NON-USER: CPT | Performed by: INTERNAL MEDICINE

## 2025-07-09 PROCEDURE — 3077F SYST BP >= 140 MM HG: CPT | Performed by: INTERNAL MEDICINE

## 2025-07-09 PROCEDURE — 3008F BODY MASS INDEX DOCD: CPT | Performed by: INTERNAL MEDICINE

## 2025-07-09 PROCEDURE — 99214 OFFICE O/P EST MOD 30 MIN: CPT | Performed by: INTERNAL MEDICINE

## 2025-07-09 RX ORDER — ATORVASTATIN CALCIUM 80 MG/1
80 TABLET, FILM COATED ORAL NIGHTLY
Qty: 90 TABLET | Refills: 3 | Status: SHIPPED | OUTPATIENT
Start: 2025-07-09

## 2025-07-09 RX ORDER — CARVEDILOL 25 MG/1
37.5 TABLET ORAL
Qty: 270 TABLET | Refills: 3 | Status: SHIPPED | OUTPATIENT
Start: 2025-07-09 | End: 2026-07-09

## 2025-07-09 RX ORDER — DOFETILIDE 0.25 MG/1
250 CAPSULE ORAL EVERY 12 HOURS
Qty: 180 CAPSULE | Refills: 3 | Status: SHIPPED | OUTPATIENT
Start: 2025-07-09 | End: 2026-07-09

## 2025-07-09 RX ORDER — IRBESARTAN 150 MG/1
150 TABLET ORAL NIGHTLY
Qty: 90 TABLET | Refills: 3 | Status: SHIPPED | OUTPATIENT
Start: 2025-07-09 | End: 2026-07-09

## 2025-07-09 RX ORDER — CLONIDINE HYDROCHLORIDE 0.2 MG/1
0.2 TABLET ORAL 2 TIMES DAILY
Qty: 180 TABLET | Refills: 3 | Status: SHIPPED | OUTPATIENT
Start: 2025-07-09 | End: 2026-07-09

## 2025-07-09 RX ORDER — SPIRONOLACTONE 25 MG/1
25 TABLET ORAL DAILY
Qty: 90 TABLET | Refills: 3 | Status: SHIPPED | OUTPATIENT
Start: 2025-07-09 | End: 2026-07-09

## 2025-07-09 RX ORDER — AMLODIPINE BESYLATE 10 MG/1
20 TABLET ORAL DAILY
Qty: 180 TABLET | Refills: 3 | Status: SHIPPED | OUTPATIENT
Start: 2025-07-09 | End: 2026-07-09

## 2025-07-09 NOTE — PATIENT INSTRUCTIONS
Please bring all medicines, vitamins, and herbal supplements with you when you come to the office.    Prescriptions will not be filled unless you are compliant with your follow up appointments or have a follow up appointment scheduled as per instruction of your physician. Refills should be requested at the time of your visit.     BMI was above normal measurement. Current weight: (!) 190 kg (419 lb)  Weight change since last visit (-) denotes wt loss -4 lbs   Weight loss needed to achieve BMI 25: 245.1 Lbs  Weight loss needed to achieve BMI 30: 210.4 Lbs  Provided instructions on dietary changes  Provided instructions on exercise.

## 2025-07-09 NOTE — LETTER
July 9, 2025     Prem Trujillo DO  [address]  [place] [address]    Patient: Efrain Joyce   YOB: 1965   Date of Visit: 7/9/2025       Dear Dr. Prem Trujillo DO:    Thank you for referring Efrain Joyce to me for evaluation. Below are my notes for this consultation.  If you have questions, please do not hesitate to call me. I look forward to following your patient along with you.       Sincerely,     Petar Nixon DO      CC: No Recipients  ______________________________________________________________________________________    Chief Complaint   Patient presents with   • Follow-up     6 month Follow up for Coronary Artery Disease        Subjective   Lb Joyce is a 60 y.o. male     60-year-old gentleman here for 6-month cardiovascular follow-up and doing well from a cardiovascular standpoint, he said no recurrent A-fib, tachyarrhythmias, hospitalizations, nitrate usage or shortness of breath.  He does complain of severe bilateral osteoarthritis of his knees and is inquiring about going back on diclofenac.    He was hospitalized for new onset atrial fibrillation and November 2024 treated with Tikosyn and converted to normal sinus rhythm and placed on Eliquis; he states this is because of cough syrup..  We did discuss the nature of paroxysmal A-fib being a lifelong issue, and risk of thromboembolic disease, importance of maintaining sinus rhythm and anticoagulation.  We also talked about the risk of gastrointestinal bleeding with multiple antiplatelet therapies and/or NSAIDs in combination with DOAC.  He is protected with pantoprazole at this time with no previous GI bleeding.  He does have underlying morbid obesity, hypertension, hyperlipidemia, diabetes, ASHD    ECG reveals sinus rhythm, QT corrected interval 423 ms and is otherwise normal     He has a history of high risk non-ST elevation MI January 2022 with revascularization of the proximal circumflex with a large drug-eluting stent he  "has had no cardiovascular events    Recommendations: Proceed with diclofenac middle of the day, observe for stool changes or bleeding, follow-up in 6 months         Review of Systems   Cardiovascular:  Positive for chest pain.   All other systems reviewed and are negative.           Vitals:    07/09/25 1038   BP: 142/68   BP Location: Left arm   Patient Position: Sitting   Pulse: 63   Weight: (!) 190 kg (419 lb)   Height: 1.778 m (5' 10\")      EKG done in office today   Objective   Physical Exam  Constitutional:       Appearance: Normal appearance.   HENT:      Nose: Nose normal.   Neck:      Vascular: No carotid bruit.   Cardiovascular:      Rate and Rhythm: Normal rate.      Pulses: Normal pulses.      Heart sounds: Normal heart sounds.   Pulmonary:      Effort: Pulmonary effort is normal.   Abdominal:      General: Bowel sounds are normal.      Palpations: Abdomen is soft.   Musculoskeletal:         General: Normal range of motion.      Cervical back: Normal range of motion.      Right lower leg: No edema.      Left lower leg: No edema.   Skin:     General: Skin is warm and dry.   Neurological:      General: No focal deficit present.      Mental Status: He is alert.   Psychiatric:         Mood and Affect: Mood normal.         Behavior: Behavior normal.         Thought Content: Thought content normal.         Judgment: Judgment normal.         Allergies  Hydralazine, Indapamide, and Benzonatate     Current Medications  Current Outpatient Medications   Medication Instructions   • amLODIPine (NORVASC) 20 mg, oral, Daily   • apixaban (ELIQUIS) 5 mg, 2 times daily   • aspirin 81 mg chewable tablet Take one tablet by mouth twice a week   • atorvastatin (LIPITOR) 80 mg, oral, Nightly   • carvedilol (COREG) 37.5 mg, oral, 2 times daily (morning and late afternoon),  TAKE 1 AND 1/2 TABLETS TWICE DAILY   • cloNIDine (CATAPRES) 0.2 mg, oral, 2 times daily   • dofetilide (TIKOSYN) 250 mcg, Every 12 hours   • glipiZIDE " (Glucotrol) 5 mg tablet 1 tablet, Daily   • irbesartan (AVAPRO) 150 mg, oral, Nightly   • metFORMIN (Glucophage) 1,000 mg tablet 1 tablet, Daily before breakfast   • metFORMIN (Glucophage) 500 mg tablet 1 tablet, Every evening   • nitroglycerin (Nitrostat) 0.4 mg SL tablet PLACE 1 TABLET UNDER THE TONGUE EVERY 5 MINUTES UP TO 3 DOSES AS NEEDED FOR CHEST PAIN.   • pantoprazole (PROTONIX) 40 mg, 2 times daily   • spironolactone (ALDACTONE) 25 mg, oral, Daily                        Assessment/Plan   1. Coronary artery disease involving native coronary artery of native heart without angina pectoris  Follow Up In Cardiology      2. History of PTCA        3. Paroxysmal atrial fibrillation (Multi)        4. High risk medication use        5. Primary hypertension        6. MATY treated with BiPAP        7. Long term current use of anticoagulant therapy        8. Diastolic congestive heart failure, unspecified HF chronicity        9. Mixed hyperlipidemia        10. BMI 60.0-69.9, adult (Multi)        11. Former smoker                 Scribe Attestation  By signing my name below, I, Sabrina MAIN RN   , Scribe   attest that this documentation has been prepared under the direction and in the presence of Petar Nixon DO.     Provider Attestation - Scribe documentation    All medical record entries made by the Scribe were at my direction and personally dictated by me. I have reviewed the chart and agree that the record accurately reflects my personal performance of the history, physical exam, discussion and plan.

## 2025-07-09 NOTE — PROGRESS NOTES
"Chief Complaint   Patient presents with    Follow-up     6 month Follow up for Coronary Artery Disease        Subjective   Lb Joyce is a 60 y.o. male     60-year-old gentleman here for 6-month cardiovascular follow-up and doing well from a cardiovascular standpoint, he said no recurrent A-fib, tachyarrhythmias, hospitalizations, nitrate usage or shortness of breath.  He does complain of severe bilateral osteoarthritis of his knees and is inquiring about going back on diclofenac.    He was hospitalized for new onset atrial fibrillation and November 2024 treated with Tikosyn and converted to normal sinus rhythm and placed on Eliquis; he states this is because of cough syrup..  We did discuss the nature of paroxysmal A-fib being a lifelong issue, and risk of thromboembolic disease, importance of maintaining sinus rhythm and anticoagulation.  We also talked about the risk of gastrointestinal bleeding with multiple antiplatelet therapies and/or NSAIDs in combination with DOAC.  He is protected with pantoprazole at this time with no previous GI bleeding.  He does have underlying morbid obesity, hypertension, hyperlipidemia, diabetes, ASHD    ECG reveals sinus rhythm, QT corrected interval 423 ms and is otherwise normal     He has a history of high risk non-ST elevation MI January 2022 with revascularization of the proximal circumflex with a large drug-eluting stent he has had no cardiovascular events    Recommendations: Proceed with diclofenac middle of the day, observe for stool changes or bleeding, follow-up in 6 months         Review of Systems   Cardiovascular:  Positive for chest pain.   All other systems reviewed and are negative.           Vitals:    07/09/25 1038   BP: 142/68   BP Location: Left arm   Patient Position: Sitting   Pulse: 63   Weight: (!) 190 kg (419 lb)   Height: 1.778 m (5' 10\")      EKG done in office today   Objective   Physical Exam  Constitutional:       Appearance: Normal appearance. "   HENT:      Nose: Nose normal.   Neck:      Vascular: No carotid bruit.   Cardiovascular:      Rate and Rhythm: Normal rate.      Pulses: Normal pulses.      Heart sounds: Normal heart sounds.   Pulmonary:      Effort: Pulmonary effort is normal.   Abdominal:      General: Bowel sounds are normal.      Palpations: Abdomen is soft.   Musculoskeletal:         General: Normal range of motion.      Cervical back: Normal range of motion.      Right lower leg: No edema.      Left lower leg: No edema.   Skin:     General: Skin is warm and dry.   Neurological:      General: No focal deficit present.      Mental Status: He is alert.   Psychiatric:         Mood and Affect: Mood normal.         Behavior: Behavior normal.         Thought Content: Thought content normal.         Judgment: Judgment normal.         Allergies  Hydralazine, Indapamide, and Benzonatate     Current Medications  Current Outpatient Medications   Medication Instructions    amLODIPine (NORVASC) 20 mg, oral, Daily    apixaban (ELIQUIS) 5 mg, 2 times daily    aspirin 81 mg chewable tablet Take one tablet by mouth twice a week    atorvastatin (LIPITOR) 80 mg, oral, Nightly    carvedilol (COREG) 37.5 mg, oral, 2 times daily (morning and late afternoon),  TAKE 1 AND 1/2 TABLETS TWICE DAILY    cloNIDine (CATAPRES) 0.2 mg, oral, 2 times daily    dofetilide (TIKOSYN) 250 mcg, Every 12 hours    glipiZIDE (Glucotrol) 5 mg tablet 1 tablet, Daily    irbesartan (AVAPRO) 150 mg, oral, Nightly    metFORMIN (Glucophage) 1,000 mg tablet 1 tablet, Daily before breakfast    metFORMIN (Glucophage) 500 mg tablet 1 tablet, Every evening    nitroglycerin (Nitrostat) 0.4 mg SL tablet PLACE 1 TABLET UNDER THE TONGUE EVERY 5 MINUTES UP TO 3 DOSES AS NEEDED FOR CHEST PAIN.    pantoprazole (PROTONIX) 40 mg, 2 times daily    spironolactone (ALDACTONE) 25 mg, oral, Daily                        Assessment/Plan   1. Coronary artery disease involving native coronary artery of native  heart without angina pectoris  Follow Up In Cardiology      2. History of PTCA        3. Paroxysmal atrial fibrillation (Multi)        4. High risk medication use        5. Primary hypertension        6. MATY treated with BiPAP        7. Long term current use of anticoagulant therapy        8. Diastolic congestive heart failure, unspecified HF chronicity        9. Mixed hyperlipidemia        10. BMI 60.0-69.9, adult (Multi)        11. Former smoker                 Scribe Attestation  By signing my name below, I, Sabrina MAIN RN   , Scribe   attest that this documentation has been prepared under the direction and in the presence of Petar Nixon DO.     Provider Attestation - Scribe documentation    All medical record entries made by the Scribe were at my direction and personally dictated by me. I have reviewed the chart and agree that the record accurately reflects my personal performance of the history, physical exam, discussion and plan.

## 2025-07-23 LAB
NON-UH HIE CHOL/HDL RATIO: 3.7
NON-UH HIE CHOLESTEROL: 137 MG/DL (ref 140–200)
NON-UH HIE HDL CHOLESTEROL: 37 MG/DL (ref 23–92)
NON-UH HIE HIGH SENSITIVE CRP: 3.5 MG/L (ref 0–0.9)
NON-UH HIE LDL CHOLESTEROL,CALCULATED: 71 MG/DL (ref 0–100)
NON-UH HIE TRIGLYCERIDE W/REFLEX: 144 MG/DL (ref 0–149)
NON-UH HIE VLDL CHOLESTEROL: 28 MG/DL

## 2026-01-27 ENCOUNTER — APPOINTMENT (OUTPATIENT)
Dept: CARDIOLOGY | Facility: CLINIC | Age: 61
End: 2026-01-27
Payer: MEDICARE